# Patient Record
Sex: FEMALE | Race: BLACK OR AFRICAN AMERICAN
[De-identification: names, ages, dates, MRNs, and addresses within clinical notes are randomized per-mention and may not be internally consistent; named-entity substitution may affect disease eponyms.]

---

## 2019-01-16 ENCOUNTER — HOSPITAL ENCOUNTER (EMERGENCY)
Dept: HOSPITAL 61 - ER | Age: 19
Discharge: HOME | End: 2019-01-16
Payer: SELF-PAY

## 2019-01-16 VITALS — WEIGHT: 180 LBS | BODY MASS INDEX: 29.99 KG/M2 | HEIGHT: 65 IN

## 2019-01-16 VITALS — DIASTOLIC BLOOD PRESSURE: 86 MMHG | SYSTOLIC BLOOD PRESSURE: 127 MMHG

## 2019-01-16 DIAGNOSIS — S39.011A: Primary | ICD-10-CM
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
## 2019-01-16 LAB
APTT PPP: YELLOW S
BACTERIA #/AREA URNS HPF: 0 /HPF
BILIRUB UR QL STRIP: NEGATIVE
FIBRINOGEN PPP-MCNC: CLEAR MG/DL
NITRITE UR QL STRIP: NEGATIVE
PH UR STRIP: 6.5 [PH]
PROT UR STRIP-MCNC: NEGATIVE MG/DL
RBC #/AREA URNS HPF: (no result) /HPF (ref 0–2)
SQUAMOUS #/AREA URNS LPF: (no result) /LPF
UROBILINOGEN UR-MCNC: 0.2 MG/DL
WBC #/AREA URNS HPF: (no result) /HPF (ref 0–4)

## 2019-01-16 PROCEDURE — 76856 US EXAM PELVIC COMPLETE: CPT

## 2019-01-16 PROCEDURE — 81001 URINALYSIS AUTO W/SCOPE: CPT

## 2019-01-16 PROCEDURE — 81025 URINE PREGNANCY TEST: CPT

## 2019-01-16 NOTE — PHYS DOC
Past Medical History


Past Medical History:  No Pertinent History


Additional Past Medical Histor:  ovarian cyst


Past Surgical History:  No Surgical History


Alcohol Use:  None


Drug Use:  None





Adult General


Chief Complaint


Chief Complaint:  ABDOMINAL PAIN





HPI


HPI





Patient is a 18  year old female who presents with lower left quadrant pain 

that has been worsening. The patient denies constipation, diarrhea, fever or 

urinary symptoms. The patient states that she does have a history of ovarian 

cysts and is worried that she might have that recurring. She denies any 

possibility of sexually transmitted disease. The patient has not tried over-the-

counter medications to mitigate the discomfort.





Review of Systems


Review of Systems





Constitutional: Denies fever or chills []


Eyes: Denies change in visual acuity, redness, or eye pain []


HENT: Denies nasal congestion or sore throat []


Respiratory: Denies cough or shortness of breath []


Cardiovascular: No additional information not addressed in HPI []


GI: See HPI


: Denies dysuria or hematuria []


Musculoskeletal: Denies back pain or joint pain []


Integument: Denies rash or skin lesions []


Neurologic: Denies headache, focal weakness or sensory changes []


Endocrine: Denies polyuria or polydipsia []





All other systems were reviewed and found to be within normal limits, except as 

documented in this note.





Allergies


Allergies





Allergies








Coded Allergies Type Severity Reaction Last Updated Verified


 


  No Known Drug Allergies    9/2/15 No











Physical Exam


Physical Exam





Constitutional: Well developed, well nourished, no acute distress, non-toxic 

appearance. []


HENT: Normocephalic, atraumatic, bilateral external ears normal, oropharynx 

moist, no oral exudates, nose normal. []


Eyes: PERRLA, EOMI, conjunctiva normal, no discharge. [] 


Neck: Normal range of motion, no tenderness, supple, no stridor. [] 


Cardiovascular:Heart rate regular rhythm, no murmur []


Lungs & Thorax:  Bilateral breath sounds clear to auscultation []


Abdomen: Bowel sounds normal, soft, left lower quadrant tenderness with no 

guarding, no masses, no pulsatile masses. [] 


Skin: Warm, dry, no erythema, no rash. [] 


Back: No tenderness, no CVA tenderness. [] 


Extremities: No tenderness, no cyanosis, no clubbing, ROM intact, no edema. [] 


Neurologic: Alert and oriented X 3, normal motor function, normal sensory 

function, no focal deficits noted. []


Psychologic: Affect normal, judgement normal, mood normal. []





Current Patient Data


Vital Signs





 Vital Signs








  Date Time  Temp Pulse Resp B/P (MAP) Pulse Ox O2 Delivery O2 Flow Rate FiO2


 


19 18:42  80  127/86 (100) 99   


 


19 18:12   16   Room Air  


 


19 17:36 98.4       





 98.4       








Lab Values





 Laboratory Tests








Test


 19


17:10 19


17:20


 


Urine Collection Type Unknown   


 


Urine Color Yellow   


 


Urine Clarity Clear   


 


Urine pH 6.5   


 


Urine Specific Gravity 1.015   


 


Urine Protein


 Negative mg/dL


(NEG-TRACE) 





 


Urine Glucose (UA)


 Negative mg/dL


(NEG) 





 


Urine Ketones (Stick)


 Negative mg/dL


(NEG) 





 


Urine Blood


 Negative (NEG)


 





 


Urine Nitrite


 Negative (NEG)


 





 


Urine Bilirubin


 Negative (NEG)


 





 


Urine Urobilinogen Dipstick


 0.2 mg/dL (0.2


mg/dL) 





 


Urine Leukocyte Esterase


 Negative (NEG)


 





 


Urine RBC


 Occ /HPF (0-2)


 





 


Urine WBC


 Occ /HPF (0-4)


 





 


Urine Squamous Epithelial


Cells Few /LPF  


 





 


Urine Bacteria


 0 /HPF (0-FEW)


 





 


POC Urine HCG, Qualitative


 


 Hcg negative


(Negative)











EKG


EKG


[]





Radiology/Procedures


Radiology/Procedures


[]PATIENT: MARISOL DAHL MACCOUNT: [id number]#: [id number]


: 2000 LOCATION: ER AGE: 18


SEX: F EXAM DT: 19 ACCESSION#: [id number]


STATUS: REG ER ORD. PHYSICIAN: MAMIE URENA 


REASON: left adnexal pain


PROCEDURE: PELVIS ULTRASOUND





Pelvic ultrasound


 


Clinical Indication: Left adnexal pain.. Patient refused endovaginal 


scan.. 


 


TRANSABDOMINAL SCAN


Uterus measures 6.6 x 5.2 x 3.7 cm. Endometrium measures 7 mm.


 


Right ovary measures 3.5 cm with intact blood flow. Left ovary measures 


2.9 cm with intact blood flow.


 


No evidence of free fluid.


 


IMPRESSION: No significant sonographic abnormality.


 


Electronically signed by: Steve Krause MD (2019 6:30 PM) [facility]














DICTATED and SIGNED BY:     STEVE KRAUSE MD


DATE:     01/1828





Course & Med Decision Making


Course & Med Decision Making


Pertinent Labs and Imaging studies reviewed. (See chart for details)





Staff Physician Addendum:


I was working in the ER during the course of this patient's visit.  I was 

available for consultation as needed, but I was not directly involved in the 

care of this patient.    


[]





Dragon Disclaimer


Dragon Disclaimer


This electronic medical record was generated, in whole or in part, using a 

voice recognition dictation system.





Departure


Departure


Impression:  


 Primary Impression:  


 Groin strain


Disposition:   HOME, SELF-CARE


Condition:  STABLE


Referrals:  


NO PCP (PCP)


Patient Instructions:  Groin Strain





Additional Instructions:  


No acute cause for your pain was noted. Take ibuprofen or Tylenol for pain. If 

not improving in 3 days follow-up with your primary care provider or return to 

the emergency department if worsening.











MAMIE URENA 2019 18:43


PRISCA GONZALEZ MD 2019 10:08

## 2019-01-16 NOTE — RAD
Pelvic ultrasound

 

Clinical Indication: Left adnexal pain.. Patient refused endovaginal 

scan.. 

 

TRANSABDOMINAL SCAN

Uterus measures 6.6 x 5.2 x 3.7 cm. Endometrium measures 7 mm.

 

Right ovary measures 3.5 cm with intact blood flow. Left ovary measures 

2.9 cm with intact blood flow.

 

No evidence of free fluid.

 

IMPRESSION: No significant sonographic abnormality.

 

Electronically signed by: Steve Krause MD (1/16/2019 6:30 PM) [facility]